# Patient Record
Sex: MALE | Race: WHITE
[De-identification: names, ages, dates, MRNs, and addresses within clinical notes are randomized per-mention and may not be internally consistent; named-entity substitution may affect disease eponyms.]

---

## 2021-09-22 ENCOUNTER — HOSPITAL ENCOUNTER (INPATIENT)
Dept: HOSPITAL 26 - MED | Age: 31
LOS: 1 days | DRG: 871 | End: 2021-09-23
Payer: COMMERCIAL

## 2021-09-22 VITALS — DIASTOLIC BLOOD PRESSURE: 69 MMHG | SYSTOLIC BLOOD PRESSURE: 89 MMHG

## 2021-09-22 VITALS
BODY MASS INDEX: 37.62 KG/M2 | HEIGHT: 69 IN | WEIGHT: 254 LBS | DIASTOLIC BLOOD PRESSURE: 120 MMHG | SYSTOLIC BLOOD PRESSURE: 147 MMHG

## 2021-09-22 VITALS — DIASTOLIC BLOOD PRESSURE: 59 MMHG | SYSTOLIC BLOOD PRESSURE: 84 MMHG

## 2021-09-22 VITALS — SYSTOLIC BLOOD PRESSURE: 104 MMHG | DIASTOLIC BLOOD PRESSURE: 47 MMHG

## 2021-09-22 VITALS — SYSTOLIC BLOOD PRESSURE: 71 MMHG | DIASTOLIC BLOOD PRESSURE: 52 MMHG

## 2021-09-22 VITALS — SYSTOLIC BLOOD PRESSURE: 110 MMHG | DIASTOLIC BLOOD PRESSURE: 53 MMHG

## 2021-09-22 VITALS — SYSTOLIC BLOOD PRESSURE: 86 MMHG | DIASTOLIC BLOOD PRESSURE: 37 MMHG

## 2021-09-22 VITALS — SYSTOLIC BLOOD PRESSURE: 92 MMHG | DIASTOLIC BLOOD PRESSURE: 60 MMHG

## 2021-09-22 VITALS — SYSTOLIC BLOOD PRESSURE: 107 MMHG | DIASTOLIC BLOOD PRESSURE: 65 MMHG

## 2021-09-22 VITALS — DIASTOLIC BLOOD PRESSURE: 60 MMHG | SYSTOLIC BLOOD PRESSURE: 92 MMHG

## 2021-09-22 VITALS — DIASTOLIC BLOOD PRESSURE: 42 MMHG | SYSTOLIC BLOOD PRESSURE: 71 MMHG

## 2021-09-22 VITALS — DIASTOLIC BLOOD PRESSURE: 38 MMHG | SYSTOLIC BLOOD PRESSURE: 75 MMHG

## 2021-09-22 VITALS — DIASTOLIC BLOOD PRESSURE: 42 MMHG | SYSTOLIC BLOOD PRESSURE: 55 MMHG

## 2021-09-22 DIAGNOSIS — Z79.899: ICD-10-CM

## 2021-09-22 DIAGNOSIS — J96.01: ICD-10-CM

## 2021-09-22 DIAGNOSIS — R65.21: ICD-10-CM

## 2021-09-22 DIAGNOSIS — Z66: ICD-10-CM

## 2021-09-22 DIAGNOSIS — N17.0: ICD-10-CM

## 2021-09-22 DIAGNOSIS — J96.02: ICD-10-CM

## 2021-09-22 DIAGNOSIS — E87.1: ICD-10-CM

## 2021-09-22 DIAGNOSIS — E44.0: ICD-10-CM

## 2021-09-22 DIAGNOSIS — I46.9: ICD-10-CM

## 2021-09-22 DIAGNOSIS — Z85.118: ICD-10-CM

## 2021-09-22 DIAGNOSIS — Z89.512: ICD-10-CM

## 2021-09-22 DIAGNOSIS — R73.9: ICD-10-CM

## 2021-09-22 DIAGNOSIS — Z20.822: ICD-10-CM

## 2021-09-22 DIAGNOSIS — Z85.830: ICD-10-CM

## 2021-09-22 DIAGNOSIS — Z89.511: ICD-10-CM

## 2021-09-22 DIAGNOSIS — A41.9: Primary | ICD-10-CM

## 2021-09-22 DIAGNOSIS — J69.0: ICD-10-CM

## 2021-09-22 DIAGNOSIS — E87.5: ICD-10-CM

## 2021-09-22 DIAGNOSIS — E80.6: ICD-10-CM

## 2021-09-22 LAB
ALBUMIN FLD-MCNC: 3 G/DL (ref 3.4–5)
ANION GAP SERPL CALCULATED.3IONS-SCNC: 13.6 MMOL/L (ref 8–16)
ANION GAP SERPL CALCULATED.3IONS-SCNC: 17.3 MMOL/L (ref 8–16)
APPEARANCE UR: (no result)
AST SERPL-CCNC: 62 U/L (ref 15–37)
BASOPHILS # BLD AUTO: 0.1 K/UL (ref 0–0.22)
BASOPHILS # BLD AUTO: 0.1 K/UL (ref 0–0.22)
BASOPHILS NFR BLD AUTO: 0.3 % (ref 0–2)
BASOPHILS NFR BLD AUTO: 0.5 % (ref 0–2)
BILIRUB SERPL-MCNC: 1.9 MG/DL (ref 0–1)
BILIRUB UR QL STRIP: NEGATIVE
BUN SERPL-MCNC: 21 MG/DL (ref 7–18)
BUN SERPL-MCNC: 32 MG/DL (ref 7–18)
CHLORIDE SERPL-SCNC: 91 MMOL/L (ref 98–107)
CHLORIDE SERPL-SCNC: 92 MMOL/L (ref 98–107)
CO2 SERPL-SCNC: 22.6 MMOL/L (ref 21–32)
CO2 SERPL-SCNC: 22.6 MMOL/L (ref 21–32)
COLOR UR: YELLOW
CREAT SERPL-MCNC: 1.4 MG/DL (ref 0.6–1.3)
CREAT SERPL-MCNC: 1.6 MG/DL (ref 0.6–1.3)
EOSINOPHIL # BLD AUTO: 0 K/UL (ref 0–0.4)
EOSINOPHIL # BLD AUTO: 0 K/UL (ref 0–0.4)
EOSINOPHIL NFR BLD AUTO: 0.1 % (ref 0–4)
EOSINOPHIL NFR BLD AUTO: 0.2 % (ref 0–4)
ERYTHROCYTE [DISTWIDTH] IN BLOOD BY AUTOMATED COUNT: 21.3 % (ref 11.6–13.7)
ERYTHROCYTE [DISTWIDTH] IN BLOOD BY AUTOMATED COUNT: 21.8 % (ref 11.6–13.7)
GFR SERPL CREATININE-BSD FRML MDRD: 66 ML/MIN (ref 90–?)
GFR SERPL CREATININE-BSD FRML MDRD: 77 ML/MIN (ref 90–?)
GLUCOSE SERPL-MCNC: 137 MG/DL (ref 74–106)
GLUCOSE SERPL-MCNC: 206 MG/DL (ref 74–106)
GLUCOSE UR STRIP-MCNC: NEGATIVE MG/DL
HCT VFR BLD AUTO: 41.4 % (ref 36–52)
HCT VFR BLD AUTO: 45.8 % (ref 36–52)
HGB BLD-MCNC: 13.2 G/DL (ref 12–18)
HGB BLD-MCNC: 14.7 G/DL (ref 12–18)
HGB UR QL STRIP: (no result)
LEUKOCYTE ESTERASE UR QL STRIP: NEGATIVE
LYMPHOCYTES # BLD AUTO: 1.1 K/UL (ref 2–11.5)
LYMPHOCYTES # BLD AUTO: 1.4 K/UL (ref 2–11.5)
LYMPHOCYTES NFR BLD AUTO: 4.4 % (ref 20.5–51.1)
LYMPHOCYTES NFR BLD AUTO: 4.8 % (ref 20.5–51.1)
MAGNESIUM SERPL-MCNC: 3 MG/DL (ref 1.8–2.4)
MCH RBC QN AUTO: 25 PG (ref 27–31)
MCH RBC QN AUTO: 25 PG (ref 27–31)
MCHC RBC AUTO-ENTMCNC: 32 G/DL (ref 33–37)
MCHC RBC AUTO-ENTMCNC: 32 G/DL (ref 33–37)
MCV RBC AUTO: 78.1 FL (ref 80–94)
MCV RBC AUTO: 78.2 FL (ref 80–94)
MONOCYTES # BLD AUTO: 1.1 K/UL (ref 0.8–1)
MONOCYTES # BLD AUTO: 2.1 K/UL (ref 0.8–1)
MONOCYTES NFR BLD AUTO: 3.7 % (ref 1.7–9.3)
MONOCYTES NFR BLD AUTO: 8.4 % (ref 1.7–9.3)
NEUTROPHILS # BLD AUTO: 22 K/UL (ref 1.8–7.7)
NEUTROPHILS # BLD AUTO: 26 K/UL (ref 1.8–7.7)
NEUTROPHILS NFR BLD AUTO: 86.7 % (ref 42.2–75.2)
NEUTROPHILS NFR BLD AUTO: 90.9 % (ref 42.2–75.2)
NITRITE UR QL STRIP: NEGATIVE
PH UR STRIP: 6 [PH] (ref 5–9)
PHOSPHATE SERPL-MCNC: 6.6 MG/DL (ref 2.5–4.9)
PLATELET # BLD AUTO: 159 K/UL (ref 140–450)
PLATELET # BLD AUTO: 192 K/UL (ref 140–450)
POTASSIUM SERPL-SCNC: 4.9 MMOL/L (ref 3.5–5.1)
POTASSIUM SERPL-SCNC: 5.2 MMOL/L (ref 3.5–5.1)
PROTHROMBIN TIME: 13.2 SECS (ref 10.8–13.4)
RBC # BLD AUTO: 5.3 MIL/UL (ref 4.2–6.1)
RBC # BLD AUTO: 5.86 MIL/UL (ref 4.2–6.1)
RBC #/AREA URNS HPF: (no result) /HPF (ref 0–5)
SODIUM SERPL-SCNC: 123 MMOL/L (ref 136–145)
SODIUM SERPL-SCNC: 126 MMOL/L (ref 136–145)
WBC # BLD AUTO: 25.4 K/UL (ref 4.8–10.8)
WBC # BLD AUTO: 28.6 K/UL (ref 4.8–10.8)
WBC,URINE: (no result) /HPF (ref 0–5)

## 2021-09-22 PROCEDURE — C9113 INJ PANTOPRAZOLE SODIUM, VIA: HCPCS

## 2021-09-22 PROCEDURE — 0BH17EZ INSERTION OF ENDOTRACHEAL AIRWAY INTO TRACHEA, VIA NATURAL OR ARTIFICIAL OPENING: ICD-10-PCS

## 2021-09-22 PROCEDURE — 5A1935Z RESPIRATORY VENTILATION, LESS THAN 24 CONSECUTIVE HOURS: ICD-10-PCS

## 2021-09-22 RX ADMIN — PROPOFOL PRN MLS/HR: 10 INJECTION, EMULSION INTRAVENOUS at 10:36

## 2021-09-22 RX ADMIN — DEXTROSE SCH MLS/HR: 50 INJECTION, SOLUTION INTRAVENOUS at 19:00

## 2021-09-22 RX ADMIN — DEXTROSE SCH MLS/HR: 50 INJECTION, SOLUTION INTRAVENOUS at 12:00

## 2021-09-22 RX ADMIN — PROPOFOL PRN MLS/HR: 10 INJECTION, EMULSION INTRAVENOUS at 15:16

## 2021-09-22 NOTE — NUR
# 16 FR Castellano catheter with 10 ml utilizing sterile technique.  Immediate 
return of 50 ml YELLOW urine noted.  Bedside drainage bag placed below level of 
bladder.  Urine sample collected and sent to lab.  Pt tolerated procedure WELL. 
OG TUBE PLACED.

## 2021-09-22 NOTE — NUR
NOTIFIED DR. ANDERSON REGARDING BLOOD PRESSURE AND HEART RATE.  PHONE ORDER TO ADMINISTER 
ATIVAN PER ORDERS.

## 2021-09-22 NOTE — NUR
CALLED SISTER AND GAVE UPDATE ON PATIENT'S CONDITION. SISTER GAVE MORE 
INFORMATION ON PATIENT'S MEDICAL HX AND MED REC.

## 2021-09-22 NOTE — NUR
Patient will be admitted to care of DR. ANDERSON. Admited to ICU.  Will go to 
room ICU BED 8. Belongings list completed.  Report to JANINA WATERS.

## 2021-09-22 NOTE — NUR
9/22/21 RD INITIAL ASSESSMENT COMPLETED



PLEASE REFER TO NUTRITION ASSESSMENT UNDER CARE ACTIVITY FOR ESTIMATED NUTRITIONAL NEEDS. 



1. CONTINUE GLUCERNA 1.2 @ 55 ML/HR; START AT 10 ML/HR ADVANCE BY 10 ML/HR Q6H

-THIS WILL PROVIDE 1584 KCAL/DAY AND 79 GM OF PROTEIN/DAY

2. CONTINUE FREE WATER FLUSH  ML Q6H

3. RD TO FOLLOW-UP 2-3 DAYS, HIGH RISK 



LISA STOCKTON RD

## 2021-09-22 NOTE — NUR
[facility]; NO CASE NUMBER @ 
THIS TIME.

## 2021-09-22 NOTE — NUR
MULTIPLE ATTEMPTS MADE FOR ANOTHER IV PLACEMENT. UNSUCCESSFUL, ERMD MADE AWARE 
AND NOTIFIED THAT IVF AND ANTIBIOTICS TO BE ADMINISTERED LATE WITH IV 
INSERTION. ERMD STATES THAT WILL NOT PLACE A CENTRAL LINE AT THIS TIME.

## 2021-09-22 NOTE — NUR
RECIVED PATINET FIGHTING VENT, RT AT BEDSIDE. PATIENT IV PULLED OUT. CATHETER 
IN PLACE, DRESSING APPLIED TO SITE AND PRESSURE APPLIED. PATIENT RECIVED 100ML 
OF IVF. ATTEMPTED TO REPLACE IV.

## 2021-09-22 NOTE — NUR
SPOKE WITH  AND EXPLAINED THAT MULTIPLE ATTEMPTS FOR ANOTHER IV 
INSERTION UNSUCCESSFUL AND ERMD UNABLE TO PLACE CENTRAL LINE AT THIS TIME. 
RECIVED NEW ORDER FOR PICC LINE INSERTION. HOUSE SUPERVISOR VIOLET NOTIFIED.

## 2021-09-22 NOTE — NUR
RCV'D PATIENT INTUBATED WITH 7.5 ETT AT 23 CM AT TEETH. TUBE IS IN PLACE AND SECURED WITH 
ANCHOR-FAST. NO SOB OR DISTRESS NOTES. VENT CONNECTED TO RED OUTLET. ALARMS AUDIBLE. AMBU 
BAG AT BEDSIDE. WILL CONTINUE TO MONITOR PATIENT.

## 2021-09-22 NOTE — NUR
CALLED SISTER; NORMA HINOJOSA  [phone] ASKED ABOUT MORTUARY PLANS FOR BROTHER, NO MORTUARY 
SELECTED. NORMA ASKED IF POSSIBLE TO GET IN CONTACT WITH LOCAL MORTUARIES. BECK NOGUERA   AND MARY KATE  GIVEN. BELONGINGS ON HOLD, FAMILY TO  
IN AM.

## 2021-09-22 NOTE — NUR
ABNORM ABG RESULTS REPORTED TO  W/ NO CHANGES AT THIS TIME   

VENT MODE WAS CHANGED TO PC P22 +5 f16 100% POST ABG DUE TO HIGH PIPs PT 
RECIEVING APPROX 450  Vt ED  WAS INFORMED

## 2021-09-22 NOTE — NUR
MULTIPLE ATTEMPTS AT IV PLACEMENT ATTEMPTED TO ADMINISTER MEDICATIONS AS 
ORDERD. IV PLACED IN L HAND. IVF BEING ADMINISTERED.

## 2021-09-22 NOTE — NUR
DC PLANNIN YRS OLD MALE PATIENT WAS ADMITTED FROM HOME WITH A DX OF METASTATIC LUNG CA, SEPTIC 
SHOCK, RESP FAILURE. PATIENT HAS A HX OF METASTATIC OSTEOSARCOMA , RIGHT BKA. CXR SHOWED 
BILATERAL PULMONARY INFILTRATE . RAPID COVID TEST NEGATIVE. URINE AND BLOOD CULTURE PENDING. 
INTUBATED SEDATED FI02 100% ADMINISTERED IVF, IV ABX ZOSYN AND VANCOMYCIN. CONSULTED WITH 
PULMO AND NEPHRO. CM TO FOLLOW.


-------------------------------------------------------------------------------

Addendum: 21 at 1251 by Valerie Miller RN

-------------------------------------------------------------------------------

DC PLANNING:

CALLED [facility] SPOKE WITH TIFFANY KAHN STATED THEY  REVIEWED AND APPROVE FOR TODAY, NO CM 
ASSIGNED YET BUT WILL EMAIL TO THE COORDINATOR AND THE ASSIGNED CM WILL CALL BACK CM TO 
FOLLOW

## 2021-09-22 NOTE — NUR
MONITOR SHOWING ASYSTOLE, DR. ANDERSON NOTIFIED. SAYS HE IS NO LONGER IN HOSPITAL ASKED TO 
CALL ER DOCTOR TO PRONOUNCE PATIENT.  ER CALLED, AWAITING ER DOCTOR TO PRONOUNCE PATIENT.  
MONITOR REMAINS ASYSTOLE.

## 2021-09-22 NOTE — NUR
ERMD MADE AWARE THAT PATIENT BP:62/23. NEW ORDER FOR LEVOHPED 4MG GIVEN AS 
TITRATING DRIP. PATIENT REMAINS ON CARDIAC MONITOR. BED IS LOCKED AND IN LOWEST 
POSTION. RT AT BEDSIDE.

## 2021-09-22 NOTE — NUR
PATIENT HAS BEEN SCREENED AND CATEGORIZED AS HIGH NUTRITION RISK. PATIENT WILL BE SEEN 
WITHIN 1-2 DAYS OF ADMISSION.



09/22/21-09/23/21



FNS CONSULT RECEIVED FOR TUBE FEEDING



LISA STOCKTON RD

## 2021-09-22 NOTE — NUR
PATIENT 32 Y/O MALE ARRIVED TO ER WITH AGONAL RESPIRATIONS AND ALTERED. PATIENT 
NOTED WITH COLD CLAMY SKIN AND JAUNDICE. PATIENT WAS INTUBATED W/ 7.5 ETT 
SECURED @ 23CM  PT PLACED ON VENT AC/ +5 f16 100%. PER EMS PATIENT HAS 
MEDICAL HX OF LUNG CANCER THAT HAS METASTASIZED TO SPINE AND BONE. PATIENT 
NOTED WITH RUSSELL. RT, PRIMARY RN, CHARGE RN, AND EMT AT BEDSIDE FOR INTUBATION.



MEDHX: LUNG CANCER, HTN, BKA

NKA

## 2021-09-22 NOTE — NUR
PT BIBA AND WAS INTUBATED W/ 7.5 ETT SECURED @ 23CM  PT PLACED ON VENT AC/VC 
475 +5 f16 100%  VENT PLUGGED INTO RED OUTLET W/ ALARMS ON AND AUDIBLE AMBU 
REMAINS AT BEDSIDE PENDING CXR FOR TUBE PLACEMENT.  CONDENSATION VISABLE IN 
TUBE W/ COLOR CHANGE ON EZ CAP (CO2)

## 2021-09-22 NOTE — NUR
TRANSFERRED PT FROM ED TO ICU WITH NO INCIDENT. VENT CONNECTED TO RED OUTLET. WILL CONTINUE 
TO MONITOR PATIENT.

## 2021-09-23 NOTE — NUR
[facility]